# Patient Record
Sex: MALE | Race: OTHER | HISPANIC OR LATINO | URBAN - METROPOLITAN AREA
[De-identification: names, ages, dates, MRNs, and addresses within clinical notes are randomized per-mention and may not be internally consistent; named-entity substitution may affect disease eponyms.]

---

## 2017-01-02 ENCOUNTER — EMERGENCY (EMERGENCY)
Facility: HOSPITAL | Age: 42
LOS: 1 days | Discharge: HOME | End: 2017-01-02
Attending: EMERGENCY MEDICINE | Admitting: EMERGENCY MEDICINE
Payer: MEDICARE

## 2017-01-02 VITALS
SYSTOLIC BLOOD PRESSURE: 134 MMHG | TEMPERATURE: 98 F | HEART RATE: 76 BPM | OXYGEN SATURATION: 96 % | RESPIRATION RATE: 18 BRPM | DIASTOLIC BLOOD PRESSURE: 87 MMHG

## 2017-01-02 DIAGNOSIS — R10.31 RIGHT LOWER QUADRANT PAIN: ICD-10-CM

## 2017-01-02 DIAGNOSIS — Z88.0 ALLERGY STATUS TO PENICILLIN: ICD-10-CM

## 2017-01-02 LAB
ALBUMIN SERPL ELPH-MCNC: 4.2 G/DL — SIGNIFICANT CHANGE UP (ref 3.4–5)
ALP SERPL-CCNC: 56 U/L — SIGNIFICANT CHANGE UP (ref 40–120)
ALT FLD-CCNC: 35 U/L — SIGNIFICANT CHANGE UP (ref 12–42)
ANION GAP SERPL CALC-SCNC: 4 MMOL/L — LOW (ref 9–16)
APPEARANCE UR: CLEAR — SIGNIFICANT CHANGE UP
APTT BLD: 33.5 SEC — SIGNIFICANT CHANGE UP (ref 27.5–37.4)
AST SERPL-CCNC: 21 U/L — SIGNIFICANT CHANGE UP (ref 15–37)
BASOPHILS NFR BLD AUTO: 0.2 % — SIGNIFICANT CHANGE UP (ref 0–2)
BILIRUB SERPL-MCNC: 0.5 MG/DL — SIGNIFICANT CHANGE UP (ref 0.2–1.2)
BILIRUB UR-MCNC: NEGATIVE — SIGNIFICANT CHANGE UP
BUN SERPL-MCNC: 10 MG/DL — SIGNIFICANT CHANGE UP (ref 7–23)
CALCIUM SERPL-MCNC: 9.1 MG/DL — SIGNIFICANT CHANGE UP (ref 8.5–10.5)
CHLORIDE SERPL-SCNC: 105 MMOL/L — SIGNIFICANT CHANGE UP (ref 96–108)
CO2 SERPL-SCNC: 30 MMOL/L — SIGNIFICANT CHANGE UP (ref 22–31)
COLOR SPEC: YELLOW — SIGNIFICANT CHANGE UP
CREAT SERPL-MCNC: 0.97 MG/DL — SIGNIFICANT CHANGE UP (ref 0.5–1.3)
DIFF PNL FLD: NEGATIVE — SIGNIFICANT CHANGE UP
EOSINOPHIL NFR BLD AUTO: 0.7 % — SIGNIFICANT CHANGE UP (ref 0–6)
GLUCOSE SERPL-MCNC: 85 MG/DL — SIGNIFICANT CHANGE UP (ref 70–99)
GLUCOSE UR QL: NEGATIVE — SIGNIFICANT CHANGE UP
HCT VFR BLD CALC: 44 % — SIGNIFICANT CHANGE UP (ref 39–50)
HGB BLD-MCNC: 16 G/DL — SIGNIFICANT CHANGE UP (ref 13–17)
INR BLD: 1.05 — SIGNIFICANT CHANGE UP (ref 0.88–1.16)
KETONES UR-MCNC: NEGATIVE — SIGNIFICANT CHANGE UP
LEUKOCYTE ESTERASE UR-ACNC: NEGATIVE — SIGNIFICANT CHANGE UP
LYMPHOCYTES # BLD AUTO: 34.1 % — SIGNIFICANT CHANGE UP (ref 13–44)
MCHC RBC-ENTMCNC: 29.6 PG — SIGNIFICANT CHANGE UP (ref 27–34)
MCHC RBC-ENTMCNC: 36.4 G/DL — HIGH (ref 32–36)
MCV RBC AUTO: 81.3 FL — SIGNIFICANT CHANGE UP (ref 80–100)
MONOCYTES NFR BLD AUTO: 8.5 % — SIGNIFICANT CHANGE UP (ref 2–14)
NEUTROPHILS NFR BLD AUTO: 56.5 % — SIGNIFICANT CHANGE UP (ref 43–77)
NITRITE UR-MCNC: NEGATIVE — SIGNIFICANT CHANGE UP
PH UR: 7 — SIGNIFICANT CHANGE UP (ref 4–8)
PLATELET # BLD AUTO: 210 K/UL — SIGNIFICANT CHANGE UP (ref 150–400)
POTASSIUM SERPL-MCNC: 3.8 MMOL/L — SIGNIFICANT CHANGE UP (ref 3.5–5.3)
POTASSIUM SERPL-SCNC: 3.8 MMOL/L — SIGNIFICANT CHANGE UP (ref 3.5–5.3)
PROT SERPL-MCNC: 7.8 G/DL — SIGNIFICANT CHANGE UP (ref 6.4–8.2)
PROT UR-MCNC: NEGATIVE MG/DL — SIGNIFICANT CHANGE UP
PROTHROM AB SERPL-ACNC: 11.7 SEC — SIGNIFICANT CHANGE UP (ref 10–13.1)
RBC # BLD: 5.41 M/UL — SIGNIFICANT CHANGE UP (ref 4.2–5.8)
RBC # FLD: 11.9 % — SIGNIFICANT CHANGE UP (ref 10.3–16.9)
SODIUM SERPL-SCNC: 139 MMOL/L — SIGNIFICANT CHANGE UP (ref 135–145)
SP GR SPEC: 1.01 — SIGNIFICANT CHANGE UP (ref 1–1.03)
UROBILINOGEN FLD QL: 0.2 E.U./DL — SIGNIFICANT CHANGE UP
WBC # BLD: 5.7 K/UL — SIGNIFICANT CHANGE UP (ref 3.8–10.5)
WBC # FLD AUTO: 5.7 K/UL — SIGNIFICANT CHANGE UP (ref 3.8–10.5)

## 2017-01-02 PROCEDURE — 99284 EMERGENCY DEPT VISIT MOD MDM: CPT

## 2017-01-02 RX ORDER — SODIUM CHLORIDE 9 MG/ML
3 INJECTION INTRAMUSCULAR; INTRAVENOUS; SUBCUTANEOUS ONCE
Qty: 0 | Refills: 0 | Status: COMPLETED | OUTPATIENT
Start: 2017-01-02 | End: 2017-01-02

## 2017-01-02 RX ORDER — SODIUM CHLORIDE 9 MG/ML
1000 INJECTION INTRAMUSCULAR; INTRAVENOUS; SUBCUTANEOUS
Qty: 0 | Refills: 0 | Status: DISCONTINUED | OUTPATIENT
Start: 2017-01-02 | End: 2017-01-06

## 2017-01-02 RX ORDER — IOHEXOL 300 MG/ML
50 INJECTION, SOLUTION INTRAVENOUS ONCE
Qty: 0 | Refills: 0 | Status: COMPLETED | OUTPATIENT
Start: 2017-01-02 | End: 2017-01-02

## 2017-01-02 RX ADMIN — SODIUM CHLORIDE 3 MILLILITER(S): 9 INJECTION INTRAMUSCULAR; INTRAVENOUS; SUBCUTANEOUS at 21:15

## 2017-01-02 RX ADMIN — SODIUM CHLORIDE 125 MILLILITER(S): 9 INJECTION INTRAMUSCULAR; INTRAVENOUS; SUBCUTANEOUS at 21:15

## 2017-01-02 RX ADMIN — IOHEXOL 50 MILLILITER(S): 300 INJECTION, SOLUTION INTRAVENOUS at 21:23

## 2017-01-02 RX ADMIN — SODIUM CHLORIDE 125 MILLILITER(S): 9 INJECTION INTRAMUSCULAR; INTRAVENOUS; SUBCUTANEOUS at 22:01

## 2017-01-02 NOTE — ED PROVIDER NOTE - MEDICAL DECISION MAKING DETAILS
mild intermittent rlq abd pain.  tender in area of appendix so ct done and normal. suspect ibs, less likely musculoskeletal.  plan motrin, pmd followup

## 2017-01-02 NOTE — ED PROVIDER NOTE - OBJECTIVE STATEMENT
here with right lower abd discomfort for the past 3 days.  Has IBS but this feels different.  Pressure/dull ache.  Worse when touching area.  Denies fever, vomiting, diarrhea.  Did have chills and urinary urgency/frequency.

## 2017-01-03 VITALS
DIASTOLIC BLOOD PRESSURE: 75 MMHG | TEMPERATURE: 98 F | HEART RATE: 66 BPM | OXYGEN SATURATION: 95 % | RESPIRATION RATE: 18 BRPM | SYSTOLIC BLOOD PRESSURE: 122 MMHG

## 2017-01-03 PROCEDURE — 85025 COMPLETE CBC W/AUTO DIFF WBC: CPT

## 2017-01-03 PROCEDURE — 85610 PROTHROMBIN TIME: CPT

## 2017-01-03 PROCEDURE — 99284 EMERGENCY DEPT VISIT MOD MDM: CPT | Mod: 25

## 2017-01-03 PROCEDURE — 80053 COMPREHEN METABOLIC PANEL: CPT

## 2017-01-03 PROCEDURE — 81003 URINALYSIS AUTO W/O SCOPE: CPT

## 2017-01-03 PROCEDURE — 85730 THROMBOPLASTIN TIME PARTIAL: CPT

## 2017-01-03 PROCEDURE — 74177 CT ABD & PELVIS W/CONTRAST: CPT

## 2017-01-03 PROCEDURE — 74177 CT ABD & PELVIS W/CONTRAST: CPT | Mod: 26

## 2017-01-03 RX ADMIN — SODIUM CHLORIDE 125 MILLILITER(S): 9 INJECTION INTRAMUSCULAR; INTRAVENOUS; SUBCUTANEOUS at 00:38

## 2017-08-17 ENCOUNTER — EMERGENCY (EMERGENCY)
Facility: HOSPITAL | Age: 42
LOS: 1 days | Discharge: HOME | End: 2017-08-17
Admitting: EMERGENCY MEDICINE
Payer: MEDICARE

## 2017-08-17 VITALS
DIASTOLIC BLOOD PRESSURE: 89 MMHG | SYSTOLIC BLOOD PRESSURE: 124 MMHG | HEART RATE: 70 BPM | OXYGEN SATURATION: 98 % | TEMPERATURE: 98 F | RESPIRATION RATE: 18 BRPM

## 2017-08-17 DIAGNOSIS — K62.89 OTHER SPECIFIED DISEASES OF ANUS AND RECTUM: ICD-10-CM

## 2017-08-17 DIAGNOSIS — Z88.0 ALLERGY STATUS TO PENICILLIN: ICD-10-CM

## 2017-08-17 DIAGNOSIS — K64.4 RESIDUAL HEMORRHOIDAL SKIN TAGS: ICD-10-CM

## 2017-08-17 PROBLEM — K58.9 IRRITABLE BOWEL SYNDROME WITHOUT DIARRHEA: Chronic | Status: ACTIVE | Noted: 2017-01-02

## 2017-08-17 PROCEDURE — 99282 EMERGENCY DEPT VISIT SF MDM: CPT

## 2017-08-17 RX ORDER — LIDOCAINE/HYDROCORTISONE AC 3 %-0.5 %
1 CREAM WITH APPLICATOR RECTAL
Qty: 1 | Refills: 0 | OUTPATIENT
Start: 2017-08-17 | End: 2017-08-27

## 2017-08-17 RX ORDER — HYDROCORTISONE 1 %
1 OINTMENT (GRAM) TOPICAL
Qty: 1 | Refills: 0 | OUTPATIENT
Start: 2017-08-17 | End: 2017-08-31

## 2017-08-17 NOTE — ED ADULT TRIAGE NOTE - OTHER COMPLAINTS
patient referred from urgent care for thrombosed Hemorid. Patient denies any bleeding. Reports "there's a dark spot." Reports rectal pain.

## 2017-08-17 NOTE — ED ADULT NURSE NOTE - ADDITIONAL PRINTED INSTRUCTIONS GIVEN
dc with instructions for followup patient to followup with Dr Rangel and return to ER for worsening symptoms.

## 2017-08-17 NOTE — ED PROVIDER NOTE - MEDICAL DECISION MAKING DETAILS
40 yo male with h/o IBS, sent to the hospital from the urgent care for his thrombosed external hemorrhoids. Pt appears well, has soft, NTND abdomen, no rectal bleeding. pending surgical eval and disposition.

## 2017-08-17 NOTE — ED ADULT NURSE NOTE - OBJECTIVE STATEMENT
Patient arrived to ED via walk-in stating, "I was sent here from Urgent Care, I have a hemorrhoid."  Patient is A&Ox3, in NAD, complaining of 9/10 rectal pain.  Patient denies any bleeding, fevers, chills or any other complaints.  Will continue with plan of care.

## 2017-08-17 NOTE — CONSULT NOTE ADULT - ASSESSMENT
41M with IBS-D here with an external hemorrhoid that is most likely in the resolution phase of a thrombosed hemorrhoid. No current indication for I&D as pain is already resolving.  - Would continue conservative therapy as pt has already be doing.  - Sitz baths, combination steroid and local anesthetic   - Stool softeners only if BMs become more formed  - Gentle perineal care  - Pt can follow up in [facility] with Dr. Rangel ([phone]) as needed.  - Discussed with Chief and Dr. Butler.

## 2017-08-17 NOTE — ED PROVIDER NOTE - OBJECTIVE STATEMENT
40 yo male with h/o IBS, in the Er c/o painful external  hemorrhoids. Pt noted it few days ago, reports that hemorrhoid increased in size and OTC medications along with sitz bath did not help. Pt was seen at the urgent care today and referred to ER to seen by a surgeon, as his hemorrhoids appears thrombosed. Pt denies any blood per rectum, admits that he recently had some diet change and had very stressful past 2 weeks.  Denies f/c, abdominal pain, n/v, denies anal intercourse.

## 2017-08-17 NOTE — ED PROVIDER NOTE - GASTROINTESTINAL, MLM
Abdomen soft, non-tender, non distended, no g/r, BS+, rectal exam-limited, external hemorrhoid visualized, 2 cm, with some area of dark bluish discoloration+,

## 2017-08-17 NOTE — CONSULT NOTE ADULT - SUBJECTIVE AND OBJECTIVE BOX
41M with a Hx of IBS-D and an extensive orthopedic surgical hx presents with a 4day hx of a hemorroid "flare". He says that he has had hemorroidal dz for a while 2/2 his IBS-D and that on Monday he noticed an acute sharp pain with BM and stretching that was throbbing and constant. Yesterday he said the pain became much better but he started to notice significant pruritus. He has already been managing his hemorrhoid this week with post-BM sitz baths, prep H. He went to an urgent care center today for evaluation because he examined the area himself and noticed a bluish area.  He was then sent to the ED from the Urgent Care Clinic for evaluation and treatment.  He denies F/C/N/V and has been having his normal loose BMs.  He notes that he does not have a GI that helps manage his IBS, but rather controls it with diet and exercise. He denies any recent anal intercourse.      PAST MEDICAL & SURGICAL HISTORY:  Irritable bowel syndrome (IBS)  No significant past surgical history      ROS: See HPI    SOCIAL HISTORY:  Smoke: Occasional smoker  EtOH: occasional    Vital Signs Last 24 Hrs  T(C): 36.9 (17 Aug 2017 12:18), Max: 36.9 (17 Aug 2017 12:18)  T(F): 98.4 (17 Aug 2017 12:18), Max: 98.4 (17 Aug 2017 12:18)  HR: 70 (17 Aug 2017 12:18) (70 - 70)  BP: 124/89 (17 Aug 2017 12:18) (124/89 - 124/89)  BP(mean): --  RR: 18 (17 Aug 2017 12:18) (18 - 18)  SpO2: 98% (17 Aug 2017 12:18) (98% - 98%)    PHYSICAL EXAM    Gen: NAD  CV: RRR  Pulm: CTAB  Abd: Soft, NT/ND  Rectal: perianal area has excoriated skin, right posterior external hemorrhoid roughly 2cm in diameter, that is soft and minmally ttp.  There is a small area with a bluish hue on the medial aspect of the hemorrhoid that is far more ttp when performing the digital rectal exam.  Digital rectal exam incomplete due to discomfort however good sphincter tone noted with stool in the vault.  Ext: WWP

## 2018-12-14 ENCOUNTER — EMERGENCY (EMERGENCY)
Facility: HOSPITAL | Age: 43
LOS: 1 days | Discharge: HOME | End: 2018-12-14
Attending: EMERGENCY MEDICINE | Admitting: EMERGENCY MEDICINE
Payer: MEDICARE

## 2018-12-14 VITALS
HEIGHT: 69 IN | HEART RATE: 81 BPM | TEMPERATURE: 99 F | OXYGEN SATURATION: 99 % | DIASTOLIC BLOOD PRESSURE: 82 MMHG | WEIGHT: 149.91 LBS | RESPIRATION RATE: 18 BRPM | SYSTOLIC BLOOD PRESSURE: 121 MMHG

## 2018-12-14 VITALS
RESPIRATION RATE: 18 BRPM | OXYGEN SATURATION: 98 % | TEMPERATURE: 98 F | DIASTOLIC BLOOD PRESSURE: 80 MMHG | SYSTOLIC BLOOD PRESSURE: 134 MMHG | HEART RATE: 78 BPM

## 2018-12-14 DIAGNOSIS — Z88.0 ALLERGY STATUS TO PENICILLIN: ICD-10-CM

## 2018-12-14 DIAGNOSIS — R20.0 ANESTHESIA OF SKIN: ICD-10-CM

## 2018-12-14 DIAGNOSIS — Z79.899 OTHER LONG TERM (CURRENT) DRUG THERAPY: ICD-10-CM

## 2018-12-14 DIAGNOSIS — M25.512 PAIN IN LEFT SHOULDER: ICD-10-CM

## 2018-12-14 DIAGNOSIS — Z79.52 LONG TERM (CURRENT) USE OF SYSTEMIC STEROIDS: ICD-10-CM

## 2018-12-14 DIAGNOSIS — M54.9 DORSALGIA, UNSPECIFIED: ICD-10-CM

## 2018-12-14 DIAGNOSIS — R07.89 OTHER CHEST PAIN: ICD-10-CM

## 2018-12-14 PROCEDURE — 99284 EMERGENCY DEPT VISIT MOD MDM: CPT | Mod: 25

## 2018-12-14 PROCEDURE — 93005 ELECTROCARDIOGRAM TRACING: CPT

## 2018-12-14 PROCEDURE — 71046 X-RAY EXAM CHEST 2 VIEWS: CPT

## 2018-12-14 PROCEDURE — 71046 X-RAY EXAM CHEST 2 VIEWS: CPT | Mod: 26

## 2018-12-14 PROCEDURE — 73030 X-RAY EXAM OF SHOULDER: CPT

## 2018-12-14 PROCEDURE — 93931 UPPER EXTREMITY STUDY: CPT

## 2018-12-14 PROCEDURE — 93931 UPPER EXTREMITY STUDY: CPT | Mod: 26,LT

## 2018-12-14 PROCEDURE — 93010 ELECTROCARDIOGRAM REPORT: CPT

## 2018-12-14 PROCEDURE — 73030 X-RAY EXAM OF SHOULDER: CPT | Mod: 26

## 2018-12-14 NOTE — ED ADULT NURSE NOTE - CHPI ED NUR SYMPTOMS NEG
no chills/no vomiting/no decreased eating/drinking/no dizziness/no fever/no pain/no weakness/no nausea/no tingling

## 2018-12-14 NOTE — ED PROVIDER NOTE - DIAGNOSTIC INTERPRETATION
CHEST XRAY INTERPRETATION: lungs clear, heart shadow normal, bony structures intact   L shoulder INTERPRETATION:  no acute fracture; no soft tissue swelling noted; normal bony alignment.

## 2018-12-14 NOTE — ED PROVIDER NOTE - PHYSICAL EXAMINATION
Constitutional: Well appearing, well nourished, awake, alert, oriented to person, place, time/situation and in no apparent distress.  ENMT: Airway patent. Normal MM  Eyes: Clear bilaterally. PERRL. EOMI  Cardiac: Normal rate, regular rhythm.  Heart sounds S1, S2.  No murmurs, rubs or gallops.  Respiratory: Breaths sounds equal and clear b/l. No increased WOB, tachypnea, hypoxia, or accessory mm use. Pt speaks in full sentences.   Gastrointestinal: Abd soft, NT, ND, NABS. No guarding, rebound, or rigidity. No pulsatile abdominal masses. No organomegaly appreciated. No CVAT   Musculoskeletal: Range of motion is not limited. No swelling, changes in calor / pallor. 2+ radial pulses b/l. Motor intact. 5/5 strength in all ext. Dec sensation to haphazardly, not in specific dermatome, in LUE, inconsistent exam. Able to differentiate dull vs sharp, but sometimes reports hypesthesias w/ exam, and in other places dec sensation. No peripheral nn palsy. + L trapezius and paraspinal mm ttp. L chest wall ttp. FROM L shoulder w/o dec ROM or signs of trauma, although pt reports some pain w/ ROM. No midline spinal ttp.   Neuro: Alert & Oriented x 3. CN II-XII intact. No facial droop. Clear speech. SHARP w/ 5/5 strength x 4 ext. No pronator drift. Normal gait. Normal sensation except to LUE (see above in MSK). Able to differentiate dull vs sharp, but sometimes reports hypesthesias w/ exam, and in other places dec sensation.  Skin: Skin normal color for race, warm, dry and intact. No evidence of rash.  Psych: Alert and oriented to person, place, time/situation. normal mood and affect. no apparent risk to self or others.

## 2018-12-14 NOTE — ED PROVIDER NOTE - PROGRESS NOTE DETAILS
W/u neg for acute pathology. Advised need for outpt f/u, PCP, neuro. Advised to call [facility] for assistance

## 2018-12-14 NOTE — ED PROVIDER NOTE - OBJECTIVE STATEMENT
Pt w/ no sig PMHx, multiple surgeries for orthopedic injuries, reports cleft palate, p/w L arm numbness x 3 days. The numbness is located in various places in the L arm, but not in the entire arm. Pt notes the numbness in the palm of the hand, the dorsum of the hand, posterior forearm, and near the shoulder. Pt denies sleeping on the arm. 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 Pt states no recent trauma, but reports shoulder pain from former trauma. No neck pain. Pt also reports chest wall pain. No weakness. No HA, blurred vision, difficulty speaking, or dizziness. Denies drug / alcohol abuse.

## 2018-12-14 NOTE — ED PROVIDER NOTE - NSFOLLOWUPCLINICS_GEN_ALL_ED_FT
[facility]  Family Medicine  [address] [place] [address]  Phone: [phone]  Fax:     [facility]  Neurology  [address] [place] [address]  Phone: [phone]  Fax: [phone]  Follow Up Time:

## 2018-12-14 NOTE — ED ADULT NURSE NOTE - OBJECTIVE STATEMENT
pt. presented with c/o numbness to LUE for 3 days, no neuro or physical deficits noted. pt. is A&Ox3, communicates w/o difficulty, skin warm dry, no c/o pain, difficulty breathing, n/v, dizziness, fever, chills, weakness.

## 2018-12-14 NOTE — ED PROVIDER NOTE - NSFOLLOWUPINSTRUCTIONS_ED_ALL_ED_FT
Your XRays and Ultrasound did not reveal any acute abnormalities. Please follow up with medicine and neurology for further evaluation. You may call our referrals coordinator Mon-Fri 11 am - 7 pm for assistance in making appointments.

## 2018-12-14 NOTE — ED ADULT NURSE NOTE - NSIMPLEMENTINTERV_GEN_ALL_ED
Implemented All Universal Safety Interventions:  [place] to call system. Call bell, personal items and telephone within reach. Instruct patient to call for assistance. Room bathroom lighting operational. Non-slip footwear when patient is off stretcher. Physically safe environment: no spills, clutter or unnecessary equipment. Stretcher in lowest position, wheels locked, appropriate side rails in place.

## 2018-12-14 NOTE — ED ADULT TRIAGE NOTE - OTHER COMPLAINTS
Pt C/O numbness to LUE x 72hr. Pt is A&O x3, able to speak coherently, no facial droop, no arm drift.

## 2018-12-19 PROBLEM — Z00.00 ENCOUNTER FOR PREVENTIVE HEALTH EXAMINATION: Status: ACTIVE | Noted: 2018-12-19

## 2018-12-24 ENCOUNTER — APPOINTMENT (OUTPATIENT)
Dept: INTERNAL MEDICINE | Facility: CLINIC | Age: 43
End: 2018-12-24

## 2019-01-03 ENCOUNTER — APPOINTMENT (OUTPATIENT)
Dept: INTERNAL MEDICINE | Facility: CLINIC | Age: 44
End: 2019-01-03

## 2019-01-09 ENCOUNTER — APPOINTMENT (OUTPATIENT)
Dept: INTERNAL MEDICINE | Facility: CLINIC | Age: 44
End: 2019-01-09

## 2019-09-12 NOTE — ED PROVIDER NOTE - PRINCIPAL DIAGNOSIS
September 12, 2019      FRAN Graham  [address]  [facility] [address]           [facility]  [address] [facility] [address]  Phone: [phone]  Fax: [phone]          Patient: Ruby Lebron   MR Number: [id number]   YOB: 1983   Date of Visit: 9/12/2019       Dear Rosemarie Torrez:    Thank you for referring Ruby Lebron to me for evaluation. Attached you will find relevant portions of my assessment and plan of care.    If you have questions, please do not hesitate to call me. I look forward to following Ruby Lebron along with you.    Sincerely,    Jojo Blackman MD    Enclosure  CC:  No Recipients    If you would like to receive this communication electronically, please contact [email or web address] or [phone] to request more information on [employer] access.    For providers and/or their staff who would like to refer a patient to [facility], please contact us through our one-stop-shop provider referral line, [facility] , at [phone].    If you feel you have received this communication in error or would no longer like to receive these types of communications, please e-mail [email or web address]         
Abdominal pain

## 2021-01-13 ENCOUNTER — TRANSCRIPTION ENCOUNTER (OUTPATIENT)
Age: 46
End: 2021-01-13

## 2021-01-13 ENCOUNTER — EMERGENCY (EMERGENCY)
Facility: HOSPITAL | Age: 46
LOS: 1 days | Discharge: HOME | End: 2021-01-13
Attending: EMERGENCY MEDICINE | Admitting: EMERGENCY MEDICINE
Payer: MEDICARE

## 2021-01-13 VITALS
TEMPERATURE: 98 F | HEART RATE: 81 BPM | DIASTOLIC BLOOD PRESSURE: 90 MMHG | HEIGHT: 69 IN | OXYGEN SATURATION: 98 % | SYSTOLIC BLOOD PRESSURE: 133 MMHG | RESPIRATION RATE: 18 BRPM

## 2021-01-13 VITALS
SYSTOLIC BLOOD PRESSURE: 122 MMHG | RESPIRATION RATE: 18 BRPM | DIASTOLIC BLOOD PRESSURE: 77 MMHG | HEART RATE: 70 BPM | OXYGEN SATURATION: 99 %

## 2021-01-13 DIAGNOSIS — Z88.0 ALLERGY STATUS TO PENICILLIN: ICD-10-CM

## 2021-01-13 DIAGNOSIS — R07.89 OTHER CHEST PAIN: ICD-10-CM

## 2021-01-13 DIAGNOSIS — Z20.822 CONTACT WITH AND (SUSPECTED) EXPOSURE TO COVID-19: ICD-10-CM

## 2021-01-13 PROCEDURE — 80053 COMPREHEN METABOLIC PANEL: CPT

## 2021-01-13 PROCEDURE — 93005 ELECTROCARDIOGRAM TRACING: CPT

## 2021-01-13 PROCEDURE — 99285 EMERGENCY DEPT VISIT HI MDM: CPT

## 2021-01-13 PROCEDURE — U0005: CPT

## 2021-01-13 PROCEDURE — 99283 EMERGENCY DEPT VISIT LOW MDM: CPT

## 2021-01-13 PROCEDURE — 84484 ASSAY OF TROPONIN QUANT: CPT

## 2021-01-13 PROCEDURE — 85025 COMPLETE CBC W/AUTO DIFF WBC: CPT

## 2021-01-13 PROCEDURE — 93010 ELECTROCARDIOGRAM REPORT: CPT

## 2021-01-13 PROCEDURE — 36415 COLL VENOUS BLD VENIPUNCTURE: CPT

## 2021-01-13 PROCEDURE — U0003: CPT

## 2021-01-13 NOTE — ED ADULT NURSE NOTE - NS ED NURSE DC INFO COMPLEXITY
EZEKIEL left v/anne for [place] at [facility] ([phone]) notifying him that pt may be discharged by the end of this week, early next week and will need chair available for MWF. EZEKIEL requested return call and provided contact information where she can be reached. EZEKIEL remains available.    Simple: Patient demonstrates quick and easy understanding

## 2021-01-13 NOTE — ED ADULT NURSE NOTE - NSIMPLEMENTINTERV_GEN_ALL_ED
Implemented All Universal Safety Interventions:  [place] to call system. Call bell, personal items and telephone within reach. Instruct patient to call for assistance. Room bathroom lighting operational. Non-slip footwear when patient is off stretcher. Physically safe environment: no spills, clutter or unnecessary equipment. Stretcher in lowest position, wheels locked, appropriate side rails in place.

## 2021-01-13 NOTE — ED ADULT NURSE NOTE - OBJECTIVE STATEMENT
46yo male co two episodes of L neck pain today described as "throbbing pain like no other." Pt states the pain occurred 46yo male co intermittent episodes of severe L neck pain around the L carotid today described as "throbbing pain like no other." Pt also endorses constant midsternal chest pain, worsening on deep inhalation for which he was evaluated for at a different hospital about a month ago, "which they say might be costochondritis" x1 month. Pt states the episodes occurred in periods of "it would throb like five times and then it stopped for two minutes and then it would throb five more times" before pt called ambulance. Pt took aspirin prior to calling EMS. Denies SOB, N/V/D, edema, fevers/chills. At this time, episodes of neck pain have resolved. Pt to follow up with cardiologist in two weeks.

## 2021-01-14 NOTE — ED PROVIDER NOTE - PATIENT PORTAL LINK FT
You can access the FollowMyHealth Patient Portal offered by [facility] by registering at the following website: [email or web address]. By joining [employer]’s FollowMyHealth portal, you will also be able to view your health information using other applications (apps) compatible with our system.

## 2021-01-14 NOTE — ED PROVIDER NOTE - CLINICAL SUMMARY MEDICAL DECISION MAKING FREE TEXT BOX
Pt with chest pain. Will check labs, CXR, EKG. Re-assess.  On re-evaluation, pt is AAOx3 and in NAD. vitals stable. Instructions given for outpatient follow up, return to ED for worsening condition. Pt expresses understanding.

## 2021-01-14 NOTE — ED PROVIDER NOTE - OBJECTIVE STATEMENT
Pt is a 45M who presents to ED for chest pain. pt states he has been having intermittent pain for the past month, today he has left sided neck pain associated with chest pain. No shortness of breath, no numbness or tingling. Vitals stable.

## 2022-01-05 NOTE — ED PROVIDER NOTE - CROS ED SKIN ALL NEG
LINDA Echols 
negative...

## 2024-03-09 NOTE — ED ADULT NURSE NOTE - CAS TRG GEN SKIN COLOR
Group Topic:  Education    Date: 3/9/2024  Start Time: 0830  End Time: 0915  Facilitators: Rosi Ortiz MSW; Judith Luong OT    Focus: Challenging Negative Thoughts  Number in attendance: 9  Patients were provided the opportunity to view the video \"We Become What We Think About Most of the Time\" and engaged in a discussion following it. The impact our thoughts have on our feelings and subsequently on our actions were an area of focus. Patients additionally challenged their negative thoughts by creating and sharing 5 positive \"I am\" statements.     Method: Group  Attendance: Present  Participation: Active  Patient Response: Appropriate feedback, Attentive, Good eye contact, Interested in topic, and Interactive  Mood: Happy  Affect: Type: Euthymic (normal mood)   Range: Full (normal)   Congruency: Congruent   Stability: Stable  Behavior/Socialization: Appropriate to group, Cooperative, and Engaged  Thought Process: Tracking  Task Performance: Follows directions  Patient Evaluation: Independent - full participation  MERCEDES Mann, MARIA D        Normal for race

## 2024-11-11 NOTE — ED PROVIDER NOTE - CONDITION AT DISCHARGE:
You can access the FollowMyHealth Patient Portal offered by [facility] by registering at the following website: [email or web address]. By joining [employer]’s FollowMyHealth portal, you will also be able to view your health information using other applications (apps) compatible with our system. Satisfactory